# Patient Record
Sex: MALE | Race: WHITE | Employment: FULL TIME | URBAN - METROPOLITAN AREA
[De-identification: names, ages, dates, MRNs, and addresses within clinical notes are randomized per-mention and may not be internally consistent; named-entity substitution may affect disease eponyms.]

---

## 2018-09-17 ENCOUNTER — HOSPITAL ENCOUNTER (OUTPATIENT)
Age: 43
Discharge: HOME | End: 2018-09-17
Attending: FAMILY MEDICINE
Payer: COMMERCIAL

## 2018-09-17 VITALS
HEART RATE: 64 BPM | SYSTOLIC BLOOD PRESSURE: 128 MMHG | OXYGEN SATURATION: 100 % | DIASTOLIC BLOOD PRESSURE: 78 MMHG | WEIGHT: 175 LBS | TEMPERATURE: 99 F | RESPIRATION RATE: 16 BRPM

## 2018-09-17 DIAGNOSIS — R06.6 HICCUPS: Primary | ICD-10-CM

## 2018-09-17 PROCEDURE — 99203 OFFICE O/P NEW LOW 30 MIN: CPT

## 2018-09-17 PROCEDURE — 99202 OFFICE O/P NEW SF 15 MIN: CPT

## 2018-09-17 RX ORDER — BACLOFEN 5 MG/1
5 TABLET ORAL 3 TIMES DAILY PRN
Qty: 15 TABLET | Refills: 0 | Status: SHIPPED | OUTPATIENT
Start: 2018-09-17 | End: 2018-09-22

## 2018-09-17 NOTE — ED INITIAL ASSESSMENT (HPI)
Pt reports hiccups x 3 days. Denies any other sx. Pt states \"this happens to me once a year, but they usually go away after 2 days. \" denies chest pain or shortness of breath.

## 2019-10-31 ENCOUNTER — HOSPITAL ENCOUNTER (OUTPATIENT)
Age: 44
Discharge: HOME | End: 2019-10-31
Attending: EMERGENCY MEDICINE
Payer: COMMERCIAL

## 2019-10-31 VITALS
WEIGHT: 175 LBS | HEIGHT: 74 IN | RESPIRATION RATE: 18 BRPM | DIASTOLIC BLOOD PRESSURE: 89 MMHG | OXYGEN SATURATION: 99 % | HEART RATE: 63 BPM | TEMPERATURE: 97 F | BODY MASS INDEX: 22.46 KG/M2 | SYSTOLIC BLOOD PRESSURE: 131 MMHG

## 2019-10-31 DIAGNOSIS — R06.6 INTRACTABLE HICCUPS: Primary | ICD-10-CM

## 2019-10-31 PROCEDURE — 99214 OFFICE O/P EST MOD 30 MIN: CPT

## 2019-10-31 PROCEDURE — 99213 OFFICE O/P EST LOW 20 MIN: CPT

## 2019-10-31 RX ORDER — HYDROCODONE BITARTRATE AND ACETAMINOPHEN 5; 325 MG/1; MG/1
TABLET ORAL
COMMUNITY
Start: 2019-10-29

## 2019-10-31 RX ORDER — BACLOFEN 5 MG/1
1 TABLET ORAL 3 TIMES DAILY PRN
Qty: 30 TABLET | Refills: 0 | Status: SHIPPED | OUTPATIENT
Start: 2019-10-31 | End: 2019-11-10

## 2019-10-31 NOTE — ED PROVIDER NOTES
Patient Seen in: [facility] In [address] [place] [address]    History   Patient presents with:  Hiccups (respiratory)    Stated Complaint: hiccups since Tuesday     HPI    Patient complains of hiccups for 2 days after a left knee surgery that he had. atraumatic,   EYES: PERRLA, EOMI, conj sclera clear  THROAT: mmm, no lesions  NECK: supple, no meningeal signs  LUNGS: no resp distress, cta bilateral  CARDIO: RRR without murmur  GI: abdomen is soft and non tender, no masses, nl bowel sounds   EXTREMITIES

## 2020-11-21 ENCOUNTER — TELEMEDICINE (OUTPATIENT)
Dept: TELEHEALTH | Age: 45
End: 2020-11-21
Payer: COMMERCIAL

## 2020-11-21 DIAGNOSIS — Z20.822 EXPOSURE TO COVID-19 VIRUS: Primary | ICD-10-CM

## 2020-11-21 PROCEDURE — 99213 OFFICE O/P EST LOW 20 MIN: CPT | Performed by: NURSE PRACTITIONER

## 2020-11-21 NOTE — PROGRESS NOTES
Frank Clark is a 39year old male. HPI:   Patient presents with: Other: Covid testing    Encounter was conducted by video visit. Patient states his father has Covid, and patient took his father to be admitted to [facility] today.  Patient has free for 24 hours AND your symptoms have improved. The patient indicates understanding of these issues and agrees to the plan.

## 2020-11-21 NOTE — PATIENT INSTRUCTIONS
With an exposure to a person who tested positive for Covid-19, you will need to quarantine for 14 days starting from the day of your close contact with the positive person.    Even if your Covid test is negative, you are expected to continue your [employer] Client

## 2020-11-23 ENCOUNTER — APPOINTMENT (OUTPATIENT)
Dept: LAB | Age: 45
End: 2020-11-23
Attending: NURSE PRACTITIONER
Payer: COMMERCIAL

## 2020-11-23 DIAGNOSIS — Z20.822 EXPOSURE TO COVID-19 VIRUS: ICD-10-CM

## 2021-10-28 ENCOUNTER — IMMUNIZATION (OUTPATIENT)
Dept: LAB | Facility: HOSPITAL | Age: 46
End: 2021-10-28
Attending: EMERGENCY MEDICINE
Payer: COMMERCIAL

## 2021-10-28 DIAGNOSIS — Z23 NEED FOR VACCINATION: Primary | ICD-10-CM

## 2021-10-28 PROCEDURE — 0064A SARSCOV2 VAC 50MCG/0.25ML IM: CPT

## 2024-06-13 ENCOUNTER — E-VISIT (OUTPATIENT)
Dept: TELEHEALTH | Age: 49
End: 2024-06-13
Payer: COMMERCIAL

## 2024-06-13 DIAGNOSIS — J06.9 VIRAL URI WITH COUGH: Primary | ICD-10-CM

## 2024-06-13 PROCEDURE — 99422 OL DIG E/M SVC 11-20 MIN: CPT | Performed by: NURSE PRACTITIONER

## 2024-06-13 RX ORDER — BENZONATATE 200 MG/1
200 CAPSULE ORAL 3 TIMES DAILY PRN
Qty: 30 CAPSULE | Refills: 0 | Status: SHIPPED | OUTPATIENT
Start: 2024-06-13

## 2024-06-13 NOTE — PROGRESS NOTES
Arnoldo Cavanaugh is a 49 year old male.  HPI:   See answers to questions above.   Runny nose, cough, x 8 days. No fever. No sinus pressure/ pain. No wheezing.   Current Outpatient Medications   Medication Sig Dispense Refill    benzonatate 200 MG Oral Cap Take 1 capsule (200 mg total) by mouth 3 (three) times daily as needed for cough. 30 capsule 0    HYDROcodone-acetaminophen 5-325 MG Oral Tab         No past medical history on file.   Past Surgical History:   Procedure Laterality Date    Arthrotomy,open repair meniscus        No family history on file.   Social History:  Social History     Socioeconomic History    Marital status: Single   Tobacco Use    Smoking status: Never    Smokeless tobacco: Never         ASSESSMENT AND PLAN:     Encounter Diagnosis   Name Primary?    Viral URI with cough Yes     Medication as below  Comfort measures- see [employer] messages for instructions.   Advised follow up for new or worsening symptoms or if not improving the next few days.       Meds & Refills for this Visit:  Requested Prescriptions     Signed Prescriptions Disp Refills    benzonatate 200 MG Oral Cap 30 capsule 0     Sig: Take 1 capsule (200 mg total) by mouth 3 (three) times daily as needed for cough.       Duration of  the service:  16 minutes    Patient advised to follow up with PCP if no improvement or worsening of symptoms  Refer to [employer] message for specific patient instructions

## 2025-07-06 NOTE — PROGRESS NOTES
[facility]  Part of [facility]  Urology Clinic Note         The following individual(s) verbally consented to be recorded using ambient AI listening technology and understand that they can each withdraw their consent to this listening technology at any point by asking the clinician to turn off or pause the recording:    Patient name: Arnoldo Cavanaugh    Today I had the pleasure of seeing Arnoldo Cavanaugh in my clinic. Mr. Cavanaugh is a pleasant 50 year old male who I am seeing for:  Chief Complaint   Patient presents with    Consult     Patient is here to consult on recent gross hematuria first and last seen on Saturday. States to have burning when he voids, and bilateral flank pain. No recent imaging, no hx of kidney stones or smoking.    Patient was last seen in this department on visit date not found.    History of Present Illness  He noticed blood in his urine on Saturday, July 5, 2025, describing the presence of 'little blood clots' and 'a couple of chunks'. Since then, he has not observed further blood in his urine. He also reports burning with urination and mild flank pain.     - Gross hematuria: Endorses  - Dysuria: Endorses  - Bone pain: Denies  - Unintended weight loss: Denies    #General  - Family history genitourinary malignancy: Denies  - Kidney stones: Denies  - Occupation: [employer]  - Smoking: Denies    IPSS score is 1 (0/0/0/0/0/0/1) with QoL score of 0.     PAST MEDICAL HISTORY:  Past Medical History[1]     PAST SURGICAL HISTORY:  Past Surgical History[2]    ALLERGIES:  Allergies[3]      MEDICATIONS:  Current Outpatient Medications   Medication Instructions    benzonatate (TESSALON) 200 mg, Oral, 3 times daily PRN    HYDROcodone-acetaminophen 5-325 MG Oral Tab No dose, route, or frequency recorded.        FAMILY HISTORY:  Family History[4]     SOCIAL HISTORY:  Short Social Hx on File[5]       PHYSICAL EXAMINATION:  There were no vitals taken for this visit.  General: No  acute distress, cooperative and communicative, alert and oriented  Skin: Warm and dry  HEENT: Normocephalic, atraumatic, conjunctivae normal, moist mucous membranes, no discharge, no gross neck abnormalities  Respiratory: No respiratory distress. Chest expansion equal bilaterally.  Extremities: Moves all extremities spontaneously  Psychiatric: Normal behavior, normal affect  Penile meatus: open and in normal location  Penis: normal without rashes/lesions  Scrotum: grossly normal  Testes: normal anatomy, no tenderness to palpation  Epididymis: normal anatomy, no tenderness to palpation  DWAYNE: enlarged, smooth, without tenderness, no nodules/masses      REVIEW OF SYSTEMS:  A comprehensive 10-point review of systems was completed.  Pertinent positives and negatives are noted in the the HPI.     LABORATORY DATA:  Urine Culture Results (last three years):  No results found for: \"URINECUL\"  None available       IMAGING REVIEW:  7/01/2011 CT ABDOMEN AND PELVIS WITH CONTRAST  FINDINGS:   ABDOMEN:   THERE ARE SEVERAL NONSPECIFIC LOW DENSITY STRUCTURES IN THE LIVER AND   (IMAGES 10, 13 AND 20) THE LARGEST ONE WITH SUGGESTION OF BLOTCHY   PERIPHERAL ENHANCEMENT.  NO FOCAL PARENCHYMAL ABNORMALITIES ARE NOTED   IN THE SPLEEN.  THERE IS NO BILIARY DUCTAL DILATATION.  THE SPLEEN IS   NOT ENLARGED.     INCIDENTAL NOTE IS MADE OF SEPARATE ORIGIN FROM THE AORTA OF THE   COMMON HEPATIC ARTERY.     THERE ARE NO PANCREATIC OR ADRENAL MASSES.     THERE ARE SYMMETRIC NEPHROGRAMS BILATERALLY, WITHOUT HYDRONEPHROSIS   OR CALCULI.      THERE IS NO ABDOMINAL OR RETROPERITONEAL LYMPHADENOPATHY.  THERE IS   NO FLUID COLLECTION.           PELVIS:     THERE IS NO PELVIC MASS OR LYMPHADENOPATHY.      THERE IS NO PELVIC FLUID COLLECTION.     THE APPENDIX IS NORMAL.     SMALL BONE ISLAND IS NOTED IN THE LEFT FEMORAL HEAD.       IMPRESSION:     HYPODENSE LESIONS IN THE LIVER MAY REPRESENT HEMANGIOMAS, BUT ARE   NONSPECIFIC IN APPEARANCE.  ETIOLOGY  SHOULD BE CONFIRMED WITH   DEDICATED LIVER MRI.     OTHERWISE, NO CT FINDING TO ACCOUNT FOR THE PATIENT'S REPORTED   PERIUMBILICAL PAIN.      IMPRESSION:   #Hematuria  I had a lengthy discussion with rAnoldo Cavanaugh regarding the finding of  hematuria.  We went over the relevant anatomy of the  tract as well as the different possible etiologies and differential diagnoses, including benign causes such as infections, stones, recent instrumentation of the genitourinary tract, or benign enlargement of the prostate (in males).  We also discussed more serious potential causes including malignancy or tumors in the upper or lower urinary tracts.    The proposed workup for hematuria is as follows:  For gross hematuria:  - Voided urine sample for cytology  - Imaging in the form of a CT Urogram to evaluate the upper urinary tracts  - Cystoscopy to evaluate the bladder and urethra      They asked appropriate questions all of which were answered to their satisfaction.    #PSA  Indications, benefits and risks of prostate cancer screening with PSA and DWAYNE were discussed. AUA guidelines suggest initiating PSA screening at age 45-50 for individuals with average prostate cancer risk and 40-45 in those individuals with elevated prostate cancer risk (Black ancestry, germline mutations, strong family history of breast/ovarian cancer, strong family history of prostate cancer OR indicated by risk calculator and SDM). Because they are 50 years old, it is recommended they undergo PSA screening at this time.         PLAN:  #Hematuria  - Urine culture to rule out infection as a possible cause of hematuria   - Patient would like to proceed with workup regardless of it is associated with infection  - Cytology   - Cystoscopy   - CT urogram     #PSA  - Patient to begin screening PSA, will hold off until gets notification that urine culture is negative or is s/p treatment if positive  -An order for a PSA test has been put in the system. Stay  well hydrated, avoid sexual stimulation, and avoid anything that puts pressure on your prostate (such as bicycle or motorcycle riding) for 3-5 days before the test.    Andrew Araujo PA-C      The 21st Century Cures Act makes medical notes available to patients in the interest of transparency.  However, please be advised that this is a medical document.  It is intended as a peer to peer communication.  It is written in medical language and may contain abbreviations or verbiage that are technical and unfamiliar.  It may appear blunt or direct.  Medical documents are intended to carry relevant information, facts as evident, and the clinical opinion of the practitioner.         [1] No past medical history on file.  [2]   Past Surgical History:  Procedure Laterality Date    Arthrotomy,open repair meniscus     [3] No Known Allergies  [4] No family history on file.  [5]   Social History  Socioeconomic History    Marital status: Single   Tobacco Use    Smoking status: Never    Smokeless tobacco: Never

## 2025-07-08 ENCOUNTER — OFFICE VISIT (OUTPATIENT)
Dept: SURGERY | Facility: CLINIC | Age: 50
End: 2025-07-08
Payer: COMMERCIAL

## 2025-07-08 DIAGNOSIS — N40.0 BENIGN PROSTATIC HYPERPLASIA WITHOUT LOWER URINARY TRACT SYMPTOMS: ICD-10-CM

## 2025-07-08 DIAGNOSIS — R31.0 GROSS HEMATURIA: Primary | ICD-10-CM

## 2025-07-08 PROCEDURE — 99204 OFFICE O/P NEW MOD 45 MIN: CPT | Performed by: PHYSICIAN ASSISTANT

## 2025-07-09 ENCOUNTER — RESULTS FOLLOW-UP (OUTPATIENT)
Dept: SURGERY | Facility: CLINIC | Age: 50
End: 2025-07-09

## 2025-07-09 DIAGNOSIS — N30.01 ACUTE CYSTITIS WITH HEMATURIA: Primary | ICD-10-CM

## 2025-07-09 LAB — NON GYNE INTERPRETATION: NEGATIVE

## 2025-07-11 RX ORDER — NITROFURANTOIN 25; 75 MG/1; MG/1
100 CAPSULE ORAL 2 TIMES DAILY
Qty: 14 CAPSULE | Refills: 0 | Status: SHIPPED | OUTPATIENT
Start: 2025-07-11 | End: 2025-07-18

## 2025-07-15 ENCOUNTER — RESULTS FOLLOW-UP (OUTPATIENT)
Dept: CT IMAGING | Age: 50
End: 2025-07-15

## 2025-07-15 ENCOUNTER — HOSPITAL ENCOUNTER (OUTPATIENT)
Dept: CT IMAGING | Age: 50
Discharge: HOME | End: 2025-07-15
Attending: PHYSICIAN ASSISTANT
Payer: COMMERCIAL

## 2025-07-15 DIAGNOSIS — R31.0 GROSS HEMATURIA: ICD-10-CM

## 2025-07-15 LAB
CREAT BLD-MCNC: 0.9 MG/DL (ref 0.7–1.3)
EGFRCR SERPLBLD CKD-EPI 2021: 104 ML/MIN/1.73M2 (ref 60–?)

## 2025-07-15 PROCEDURE — 82565 ASSAY OF CREATININE: CPT

## 2025-07-15 PROCEDURE — 74178 CT ABD&PLV WO CNTR FLWD CNTR: CPT | Performed by: PHYSICIAN ASSISTANT

## 2025-07-21 ENCOUNTER — LAB ENCOUNTER (OUTPATIENT)
Dept: LAB | Age: 50
End: 2025-07-21
Attending: PHYSICIAN ASSISTANT
Payer: COMMERCIAL

## 2025-07-21 DIAGNOSIS — R31.0 GROSS HEMATURIA: ICD-10-CM

## 2025-07-21 DIAGNOSIS — N30.01 ACUTE CYSTITIS WITH HEMATURIA: ICD-10-CM

## 2025-07-21 LAB
BILIRUB UR QL: NEGATIVE
CLARITY UR: CLEAR
COLOR UR: YELLOW
GLUCOSE UR-MCNC: NORMAL MG/DL
KETONES UR-MCNC: NEGATIVE MG/DL
LEUKOCYTE ESTERASE UR QL STRIP.AUTO: 25
NITRITE UR QL STRIP.AUTO: NEGATIVE
PH UR: 5 [PH] (ref 5–8)
PROT UR-MCNC: NEGATIVE MG/DL
PSA SERPL-MCNC: 1.83 NG/ML (ref ?–4)
SP GR UR STRIP: 1.02 (ref 1–1.03)
UROBILINOGEN UR STRIP-ACNC: NORMAL

## 2025-07-21 PROCEDURE — 87086 URINE CULTURE/COLONY COUNT: CPT

## 2025-07-21 PROCEDURE — 36415 COLL VENOUS BLD VENIPUNCTURE: CPT

## 2025-07-21 PROCEDURE — 81001 URINALYSIS AUTO W/SCOPE: CPT

## 2025-07-21 PROCEDURE — 84153 ASSAY OF PSA TOTAL: CPT

## 2025-08-04 ENCOUNTER — PROCEDURE (OUTPATIENT)
Dept: SURGERY | Facility: CLINIC | Age: 50
End: 2025-08-04

## 2025-08-04 DIAGNOSIS — R31.0 GROSS HEMATURIA: Primary | ICD-10-CM

## 2025-08-04 PROCEDURE — 52000 CYSTOURETHROSCOPY: CPT | Performed by: UROLOGY

## 2025-08-08 ENCOUNTER — OFFICE VISIT (OUTPATIENT)
Dept: SURGERY | Facility: CLINIC | Age: 50
End: 2025-08-08

## 2025-08-08 DIAGNOSIS — R31.0 GROSS HEMATURIA: ICD-10-CM

## 2025-08-08 DIAGNOSIS — Z12.5 SCREENING PSA (PROSTATE SPECIFIC ANTIGEN): ICD-10-CM

## 2025-08-08 DIAGNOSIS — R30.0 DYSURIA: Primary | ICD-10-CM

## 2025-08-08 PROCEDURE — 99213 OFFICE O/P EST LOW 20 MIN: CPT | Performed by: PHYSICIAN ASSISTANT
